# Patient Record
Sex: FEMALE | Race: WHITE
[De-identification: names, ages, dates, MRNs, and addresses within clinical notes are randomized per-mention and may not be internally consistent; named-entity substitution may affect disease eponyms.]

---

## 2017-09-15 ENCOUNTER — HOSPITAL ENCOUNTER (OUTPATIENT)
Dept: HOSPITAL 47 - RADMAMWWP | Age: 52
Discharge: HOME | End: 2017-09-15
Payer: COMMERCIAL

## 2017-09-15 DIAGNOSIS — Z12.31: Primary | ICD-10-CM

## 2017-09-18 NOTE — MM
Reason for exam: screening  (asymptomatic).

Last mammogram was performed 3 years and 5 months ago.



History:

Benign excisional biopsy of the right breast, 2002.

Took hormonal contraceptives for 9 years.



Physical Findings:

A clinical breast exam by your physician is recommended on an annual basis and 

results should be correlated with mammographic findings.



MG Screening Mammo w CAD

Bilateral CC and MLO view(s) were taken.

Prior study comparison: April 29, 2014, bilateral digital screening mammo w/CAD.  

December 30, 2008, bilateral digital screening mammogram.

There are scattered fibroglandular densities.  No significant changes when 

compared with prior studies.





ASSESSMENT: Benign, BI-RAD 2



RECOMMENDATION:

Routine screening mammogram of both breasts in 1 year.

## 2020-06-10 ENCOUNTER — HOSPITAL ENCOUNTER (OUTPATIENT)
Dept: HOSPITAL 47 - LABPAT | Age: 55
Discharge: HOME | End: 2020-06-10
Attending: ORTHOPAEDIC SURGERY
Payer: COMMERCIAL

## 2020-06-10 DIAGNOSIS — S83.512D: ICD-10-CM

## 2020-06-10 DIAGNOSIS — Z01.818: Primary | ICD-10-CM

## 2020-06-10 DIAGNOSIS — M23.92: ICD-10-CM

## 2020-06-10 LAB
ANION GAP SERPL CALC-SCNC: 8 MMOL/L
BASOPHILS # BLD AUTO: 0 K/UL (ref 0–0.2)
BASOPHILS NFR BLD AUTO: 0 %
CHLORIDE SERPL-SCNC: 106 MMOL/L (ref 98–107)
CO2 SERPL-SCNC: 26 MMOL/L (ref 22–30)
EOSINOPHIL # BLD AUTO: 0.2 K/UL (ref 0–0.7)
EOSINOPHIL NFR BLD AUTO: 2 %
ERYTHROCYTE [DISTWIDTH] IN BLOOD BY AUTOMATED COUNT: 4.71 M/UL (ref 3.8–5.4)
ERYTHROCYTE [DISTWIDTH] IN BLOOD: 13.3 % (ref 11.5–15.5)
HCT VFR BLD AUTO: 43.3 % (ref 34–46)
HGB BLD-MCNC: 14.4 GM/DL (ref 11.4–16)
LYMPHOCYTES # SPEC AUTO: 3.3 K/UL (ref 1–4.8)
LYMPHOCYTES NFR SPEC AUTO: 38 %
MCH RBC QN AUTO: 30.5 PG (ref 25–35)
MCHC RBC AUTO-ENTMCNC: 33.2 G/DL (ref 31–37)
MCV RBC AUTO: 91.9 FL (ref 80–100)
MONOCYTES # BLD AUTO: 0.4 K/UL (ref 0–1)
MONOCYTES NFR BLD AUTO: 4 %
NEUTROPHILS # BLD AUTO: 4.6 K/UL (ref 1.3–7.7)
NEUTROPHILS NFR BLD AUTO: 53 %
PLATELET # BLD AUTO: 302 K/UL (ref 150–450)
POTASSIUM SERPL-SCNC: 5.4 MMOL/L (ref 3.5–5.1)
SODIUM SERPL-SCNC: 140 MMOL/L (ref 137–145)
WBC # BLD AUTO: 8.7 K/UL (ref 3.8–10.6)

## 2020-06-10 PROCEDURE — 85025 COMPLETE CBC W/AUTO DIFF WBC: CPT

## 2020-06-10 PROCEDURE — 80051 ELECTROLYTE PANEL: CPT

## 2020-06-10 PROCEDURE — 36415 COLL VENOUS BLD VENIPUNCTURE: CPT

## 2020-06-10 PROCEDURE — 93005 ELECTROCARDIOGRAM TRACING: CPT

## 2020-06-23 ENCOUNTER — HOSPITAL ENCOUNTER (OUTPATIENT)
Dept: HOSPITAL 47 - RADMAMWWP | Age: 55
End: 2020-06-23
Attending: FAMILY MEDICINE
Payer: COMMERCIAL

## 2020-06-23 DIAGNOSIS — Z12.31: Primary | ICD-10-CM

## 2020-06-23 PROCEDURE — 77063 BREAST TOMOSYNTHESIS BI: CPT

## 2020-06-23 PROCEDURE — 77067 SCR MAMMO BI INCL CAD: CPT

## 2020-06-24 NOTE — HP
HISTORY AND PHYSICAL



Surgery 06/25/2020.



Yudi Cruz 54-year-old patient seen with progressive left knee pain, instability

consistent with meniscal tear and anterior cruciate ligament tear.  We discussed

options.  She elected to proceed with arthroscopy to include allograft ACL

reconstruction.  Consent regarding the procedure was obtained.



PAST MEDICAL HISTORY:

Hypertension.



PAST SURGICAL HISTORY:

Carpal tunnel release.  Colonoscopy.



MEDICATIONS:

Atenolol.



ALLERGIES:

None.



SOCIAL HISTORY:

She denies tobacco use.



PHYSICAL EXAMINATION:

Evaluation of the left knee range of motion 0-130.  Mild effusion.  Tenderness along

the medial and lateral joint lines.  Positive medial Tana's.  Plus one/two Lachman.

Pivot shift equivocal. Collateral ligaments are stable.  Distal neurovascular exam

intact.



Left knee radiographs revealed moderate medial compartment osteoarthritis.  An MRI of

the left knee revealed medial meniscal tear, anterior cruciate ligament tear and large

joint effusion.



IMPRESSION:

1. Internal derangement, left knee with medial meniscal tear and anterior cruciate

    ligament tear.

2. Left knee osteoarthritis.

3. Hypertension.



PLAN:

Left knee arthroscopy with partial meniscectomy, allograft ACL reconstruction and

debridement.





MMODL / IJN: [id number] / Job#: [id number]

## 2020-06-25 ENCOUNTER — HOSPITAL ENCOUNTER (OUTPATIENT)
Dept: HOSPITAL 47 - OR | Age: 55
Discharge: HOME | End: 2020-06-25
Attending: ORTHOPAEDIC SURGERY
Payer: COMMERCIAL

## 2020-06-25 VITALS — DIASTOLIC BLOOD PRESSURE: 78 MMHG | HEART RATE: 71 BPM | SYSTOLIC BLOOD PRESSURE: 128 MMHG

## 2020-06-25 VITALS — RESPIRATION RATE: 16 BRPM | TEMPERATURE: 96.8 F

## 2020-06-25 VITALS — BODY MASS INDEX: 38.2 KG/M2

## 2020-06-25 DIAGNOSIS — M23.222: Primary | ICD-10-CM

## 2020-06-25 DIAGNOSIS — Z98.890: ICD-10-CM

## 2020-06-25 DIAGNOSIS — Z86.69: ICD-10-CM
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
## 2020-06-25 DIAGNOSIS — Z79.899: ICD-10-CM

## 2020-06-25 DIAGNOSIS — M94.262: ICD-10-CM

## 2020-06-25 DIAGNOSIS — I10: ICD-10-CM

## 2020-06-25 DIAGNOSIS — S83.512A: ICD-10-CM

## 2020-06-25 DIAGNOSIS — M17.12: ICD-10-CM

## 2020-06-25 DIAGNOSIS — M65.862: ICD-10-CM

## 2020-06-25 PROCEDURE — 29888 ARTHRS AID ACL RPR/AGMNTJ: CPT

## 2020-06-25 PROCEDURE — 84132 ASSAY OF SERUM POTASSIUM: CPT

## 2020-06-25 PROCEDURE — 29881 ARTHRS KNE SRG MNISECTMY M/L: CPT

## 2020-06-25 NOTE — P.OP
Date of Procedure: 06/25/20


Preoperative Diagnosis: 


Internal derangement left knee


Postoperative Diagnosis: 


1.  ACL tear left knee


2.  Medial meniscal tear left knee


3.  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


4.  Reactive synovitis medial, lateral and suprapatellar compartments left knee





Procedure(s) Performed: 


1.  Arthroscopic allograft ACL reconstruction left knee


2.  Arthroscopic partial medial meniscectomy left knee


3.  Arthroscopic chondroplasty medial femoral condyle left knee


4.  Arthroscopic partial synovectomy medial, lateral and suprapatellar 

compartments left knee


Implants: 


2Arthrex Endobutton


Anesthesia: GETA, local


Surgeon: Alvaro Arango


Assistant #1: Zachary Perdomo


Estimated Blood Loss (ml): 20


Pathology: none sent


Condition: stable


Disposition: PACU


Indications for Procedure: 


54-year-old patient seen with left knee pain and instability consistent with 

anterior cruciate ligament tear meniscal tear.  We discussed proceeding with 

arthroscopy to include allograft ACL reconstruction, patient was agreeable and 

consent was obtained.


Operative Findings: 


See description of procedure


Description of Procedure: 


Patient was taken to the operative suite.  Patient underwent a general 

anesthetic by the department of anesthesia.  Patient was given preoperative 

antibiotics.  The left lower extremity was placed in a well-padded arthroscopic 

leg gutierrez.  The left leg was prepped and draped in the normal sterile 

orthopedic fashion.  A lateral parapatellar and suprapatellar incision was made.

 Trochars were inserted.  Arthroscopy was initiated.  Suprapatellar pouch 

revealed diffuse thick reactive synovitis.  The patellofemoral joint appeared 

articulate congruently.  There was grade 2 chondromalacia of the patella with no

osteochondral tears present.  The scope was guided into the medial gutter.  No 

loose bodies or plica were identified.  The scope was then guided into the 

medial compartment.  A medial parapatellar incision was made.  Trocar inserted 

followed by probe.  There was a complex tear posterior horn medial meniscus.  

There were grade 3 chondral malacia changes of medial femoral condyle with some 

osteochondral flap tears present.  There was thick reactive synovitis 

anteriorly. Scope and probe were then guided into the intercondylar notch noting

a complete tear of the anterior cruciate ligament and an intact posterior 

cruciate ligament.  At this point Porfirio PALOMO opened the allograft and began 

preparing for implantation.  I guided the scope back into the medial 

compartment.  I performed a partial medial meniscectomy.  I performed a partial 

synovectomy.  I performed a chondroplasty medial femoral condyle.  The residual 

meniscus was stable.  The residual osteochondral surface was stable.  There was 

good decompression of the synovitis.  The scope and probe were then guided into 

lateral compartment.  The lateral meniscus was stable.  There was minimal mild 

grade 1 chondromalacia of the lateral compartment.  There was thick reactive 

synovitis anteriorly.  I performed a partial synovectomy.  There was good 

decompression of synovitis.  I now guided the scope back into the intercondylar 

notch.  I debrided the remnants of the anterior cruciate ligament.  I performed 

a notchplasty.  With the assistance of Porfirio PALOMO I created femoral and 

tibial tunnels.  We now brought the graft into the operative field.  We now 

passed the femoral tunnel making sure the Endobutton was engaged and we 

appropriately shoulder into the tunnel.  We now shuttled the tibial side of our 

graft into our tibial tunnel.  We then placed appropriate tension to the graft 

and then secured the tibial side with an Endobutton.  Residual suture limbs were

clipped.  We tension the femoral side.  A good positioning of the graft with 

excellent fixation and good intraoperative stability.  The scope was in guided 

back into the suprapatellar compartment.  I introduced a motorized shaver into 

the super compartment.  I debrided some debris/meniscal tissue and performed a 

partial synovectomy decompressing reactive synovitis.  There was good 

decompression of synovitis.  I took one more look on the entire knee no was no 

residual debris.  Instruments were now removed from the joint.  The joint was 

infiltrated with .25% Marcaine.  All portal sites are many medial incision were 

repaired with nylon suture.  Sterile dressings were applied.  The patient was 

placed into a JOSE DANIEL hose.  The extremity was placed into a knee immobilizer.  No 

tourniquet was utilized.  The patient was awakened, transferred to a bed and 

taken to recovery stable satisfactory condition.  Porfirio PALOMO assisted with 

the procedure.

## 2020-06-25 NOTE — MM
Reason for exam: screening  (asymptomatic).

Last mammogram was performed 2 years and 9 months ago.



History:

Benign excisional biopsy of the right breast, 2002.

Took hormonal contraceptives for 9 years.



Physical Findings:

A clinical breast exam by your physician is recommended on an annual basis and 

results should be correlated with mammographic findings.



MG 3D Screening Mammo W/Cad

Bilateral CC and MLO view(s) were taken.

Prior study comparison: September 15, 2017, bilateral MG screening mammo w CAD.  

April 29, 2014, bilateral digital screening mammo w/CAD.

The breast tissue is heterogeneously dense. This may lower the sensitivity of 

mammography.  There is chronic nodularity in the left breast anterior upper outer 

quadrant.  No significant changes when compared with prior studies.





ASSESSMENT: Benign, BI-RAD 2



RECOMMENDATION:

Routine screening mammogram of both breasts in 1 year.

## 2020-06-25 NOTE — P.ANPRN
Procedure Note - Anesthesia





- Nerve Block Performed


  ** Left Adductor Canal Single


Time Out Performed: Yes


Date of Procedure: 06/25/20


Procedure Start Time: 08:37


Procedure Stop Time: 08:45


Location of Patient: PreOp


Indication: Acute Post-Operative Pain, Requested by Surgeon


Sedation Type: Sedate with meaningful contact maintained


Preparation: Sterile Prep, Sterile Dressing


Position: Supine


Catheter: None


Needle Types: Pajunk


Needle Gauge: 20


Ultrasound used to visualize needle placement: Yes


Ultrasound used to observe medication spread: Yes


Injectate: 0.5% Ropivacaine (see comment for volume) (30 ml + decadron 4 mg)


Blood Aspirated: No


Pain Paresthesia on Injection Noted: No


Resistance on Injection: Normal


Image Stored and Saved: Yes


Events: Uneventful and Well Tolerated

## 2021-06-28 ENCOUNTER — HOSPITAL ENCOUNTER (OUTPATIENT)
Dept: HOSPITAL 47 - RADMAMWWP | Age: 56
Discharge: HOME | End: 2021-06-28
Attending: FAMILY MEDICINE
Payer: COMMERCIAL

## 2021-06-28 DIAGNOSIS — Z12.31: Primary | ICD-10-CM

## 2021-06-28 PROCEDURE — 77067 SCR MAMMO BI INCL CAD: CPT

## 2021-06-28 PROCEDURE — 77063 BREAST TOMOSYNTHESIS BI: CPT

## 2021-06-30 NOTE — MM
Reason for exam: screening  (asymptomatic).

Last mammogram was performed 1 year ago.



History:

Benign excisional biopsy of the right breast, 2002.

Took hormonal contraceptives for 9 years.



Physical Findings:

A clinical breast exam by your physician is recommended on an annual basis and 

results should be correlated with mammographic findings.



MG 3D Screening Mammo W/Cad

Bilateral CC and MLO view(s) were taken.

Prior study comparison: June 23, 2020, bilateral MG 3d screening mammo w/cad.  

September 15, 2017, bilateral MG screening mammo w CAD.

The breast tissue is heterogeneously dense. This may lower the sensitivity of 

mammography.





ASSESSMENT: Benign, BI-RAD 2



RECOMMENDATION:

Routine screening mammogram of both breasts in 1 year.

## 2022-06-10 ENCOUNTER — HOSPITAL ENCOUNTER (OUTPATIENT)
Dept: HOSPITAL 47 - RADMAMWWP | Age: 57
Discharge: HOME | End: 2022-06-10
Attending: FAMILY MEDICINE
Payer: COMMERCIAL

## 2022-06-10 DIAGNOSIS — Z12.31: Primary | ICD-10-CM

## 2022-06-10 PROCEDURE — 77063 BREAST TOMOSYNTHESIS BI: CPT

## 2022-06-10 PROCEDURE — 77067 SCR MAMMO BI INCL CAD: CPT

## 2022-06-13 NOTE — MM
Reason for Exam: Screening  (asymptomatic). 

Last screening mammogram was performed 12 month(s) ago.





Patient History: 

Menarche at age 13. First Full-Term Pregnancy at age 25. Hormonal Contraceptives for 9 years until

age 27. 2002, Benign Excisional Biopsy on the right side. 





Risk Values: 

Piedad 5 year model risk: 1.3%.

NCI Lifetime model risk: 9.6%.





Prior Study Comparison: 

9/15/2017 Bilateral Screening Mammogram, [facility]. 6/23/2020 Bilateral Screening Mammogram, [facility].

6/28/2021 Bilateral Screening Mammogram, [facility]. 





Tissue Density: 

The breast tissue is heterogeneously dense. This may lower the sensitivity of mammography.





Findings: 

Analyzed By CAD. 

There is no suspicious group of microcalcifications or new suspicious mass in either breast. 





Overall Assessment: Negative, BI-RAD 1





Management: 

Screening Mammogram of both breasts in 1 year.

A clinical breast exam by your physician is recommended on an annual basis and results should be

correlated with mammographic findings.



Electronically signed and approved by: Leopold M. Fregoli, M.D. Radiologis

## 2023-03-24 ENCOUNTER — HOSPITAL ENCOUNTER (OUTPATIENT)
Dept: HOSPITAL 47 - RADXRMAIN | Age: 58
Discharge: HOME | End: 2023-03-24
Attending: FAMILY MEDICINE
Payer: COMMERCIAL

## 2023-03-24 DIAGNOSIS — M77.32: ICD-10-CM

## 2023-03-24 DIAGNOSIS — M79.672: Primary | ICD-10-CM

## 2023-03-25 NOTE — XR
EXAMINATION TYPE: XR foot limited LT

 

DATE OF EXAM: 3/24/2023

 

COMPARISON: None

 

HISTORY: Pain

 

TECHNIQUE: 2 view left foot

 

FINDINGS: No acute fracture or dislocation is evident. Plantar calcaneal heel spur is present. Some m
ild soft tissue swelling over the distal dorsal foot is present.

 

The metatarsals appear intact. Alignment is preserved. Follow up exams can be performed 7-10 days fro
m acute trauma for continued pain.

 

IMPRESSION:

1.  No acute osseous abnormality left foot.

2. Plantar calcaneal heel spur

## 2023-07-14 ENCOUNTER — HOSPITAL ENCOUNTER (OUTPATIENT)
Dept: HOSPITAL 47 - RADBDWWP | Age: 58
Discharge: HOME | End: 2023-07-14
Attending: FAMILY MEDICINE
Payer: COMMERCIAL

## 2023-07-14 DIAGNOSIS — Z78.0: ICD-10-CM

## 2023-07-14 DIAGNOSIS — M85.89: ICD-10-CM

## 2023-07-14 DIAGNOSIS — Z12.31: Primary | ICD-10-CM

## 2023-07-14 PROCEDURE — 77080 DXA BONE DENSITY AXIAL: CPT

## 2023-07-14 PROCEDURE — 77063 BREAST TOMOSYNTHESIS BI: CPT

## 2023-07-14 PROCEDURE — 77067 SCR MAMMO BI INCL CAD: CPT

## 2023-07-14 NOTE — BD
EXAMINATION TYPE: Axial Bone Density

 

DATE OF EXAM: 7/14/2023

 

CLINICAL HISTORY: 57 years old Female.  ICD-10 CODE: Z78.0 POST MENOPAUSAL WITHOUT HRT

 

Nuclear Medicine Study in the last 2 weeks:

Barium Study in the last week:

Pregnant:

Height:

Weight:

 

FRAX RISK QUESTIONS:

Alcohol (3 or more units per day):  no

Family History (Parent hip fracture):  no

Glucocorticoids (More than 3mos):  no

History of Fracture in Adulthood: no

 

Secondary Osteoporosis:

  1.  Type 1 Diabetes: no

  2.  Hyperthyroidism: no

  3.  Menopause before 45: no

  4.  Malnutrition: no

  5.  Chronic liver disease: no

Rheumatoid Arthritis: no

Current Tobacco Use: no

 

RISK FACTORS 

HISTORY OF: 

Hip Fracture (Right/Left): no

Spine Fracture: no

History of Wrist Fracture: yes 

When: age 9

Surgery to Spine/Hip(right/left)/Wrist (right/left): no

Family History of Osteoporosis: no

Active: somewhat

Diet low in dairy products/other sources of calcium:  yes

Postmenopausal woman: yes

Take estrogen and/or progesterone medications: no

Lost more than 2 inches in height since high school: no

Frequent falls: no

Poor Health: no

Hyperparathyroidism: no

Adrenal Insufficiency: no

 

MEDICATIONS: 

Prednisone or other steroids: no

Thyroid Medications:  no

Osteoporosis Medications: no

Additional Medications: BPMed, 

 

 

Additional History:

 

 

EXAM MEASUREMENTS: 

Bone mineral densitometry was performed using the [employer] System.

Bone mineral density as measured about the Lumbar spine is:  

----- L1-L4(G/cm2): 1.200

T Score Values are as follows:

----- L1: -0.1

----- L2: 0.0

----- L3: 0.1

----- L4: 0.2

----- L1-L4: 0.2

 

Z Score Values are as follows:

----- L1: -0.4

----- L2: -0.3

----- L3: -0.2

----- L4: -0.1

----- L1-L4: -0.1

 

Baseline Study 

 

Bone mineral density about the R hip (g/cm2): 1.074

Bone mineral density about the L hip (g/cm2): 1.125

T Score values are as follows:

-----R Neck: -0.9

-----L Neck: -1.3

-----R Total: 0.5

-----L Total: 0.9

 

Z Score values are as follows:

-----R Neck: -0.6

-----L Neck: -1.0

-----R Total: 0.4

-----L Total: 0.8

Baseline Study 

 

FRAX%s: The graph provided illustrates a 5.6 % chance for a major osteoporotic fx and a 0.3% chance f
or the hips probability for fx in 10 years time.

 

 

 

 

IMPRESSION:

Osteopenia (T Score between -2.5 and -1).

 

There is slightly increased risk of fracture and the patient may be considered 

for treatment. 

 

Re-Screen 2-5 years.

 

NOTE:  T-SCORE=SD OF THE YOUNG ADULT MEAN.

## 2023-07-17 NOTE — MM
Reason for Exam: Screening  (asymptomatic). 

Last mammogram was performed 1 year(s) and 1 month(s) ago. 





Patient History: 

Menarche at age 13. First Full-Term Pregnancy at age 25. Postmenopausal. Hormonal Contraceptives for

9 years until age 27. 2002, Benign Excisional Biopsy on the right side. 





Risk Values: 

Piedad 5 year model risk: 1.4%.

NCI Lifetime model risk: 9.5%.





Prior Study Comparison: 

6/23/2020 Bilateral Screening Mammogram, [facility]. 6/28/2021 Bilateral Screening Mammogram, [facility].

6/10/2022 Bilateral MG 3D screening mammo w/cad, [facility]. 





Tissue Density: 

The breast tissue is heterogeneously dense. This may lower the sensitivity of mammography.





Findings: 

Analyzed By CAD. 

Nodular density upper outer left breast approximately 5 cm from the nipple. Additional views are

recommended. No suspicious calcifications are seen within either breast. 





Overall Assessment: Incomplete: need additional imaging evaluation, BI-RAD 0





Management: 

Diagnostic Mammogram of the left breast.

.



Patient should continue monthly self-breast exams.  A clinical breast exam by your physician is

recommended on an annual basis.

This exam should not preclude additional follow-up of suspicious palpable abnormalities.



Note on Piedad scores and lifetime risk:

1. A Piedad score greater than 3% is considered moderate risk. If this is the case, consider

specialist referral to assess eligibility for a risk reducing agent.

2. If overall lifetime risk for the development of breast cancer is 20% or higher, the patient may

qualify for future screening with alternating mammogram and breast MRI.



Electronically signed and approved by: Leopold M. Fregoli, M.D. Radiologis

## 2023-07-26 ENCOUNTER — HOSPITAL ENCOUNTER (OUTPATIENT)
Dept: HOSPITAL 47 - RADMAMWWP | Age: 58
Discharge: HOME | End: 2023-07-26
Attending: FAMILY MEDICINE
Payer: COMMERCIAL

## 2023-07-26 DIAGNOSIS — R92.8: Primary | ICD-10-CM

## 2023-07-26 DIAGNOSIS — Z78.0: ICD-10-CM

## 2023-07-27 NOTE — USB
Reason for Exam: Additional evaluation requested from abnormal screening. 





Patient History: 

Menarche at age 13. First Full-Term Pregnancy at age 25. Postmenopausal. Hormonal Contraceptives for

9 years until age 27. 2002, Benign Excisional Biopsy on the right side. 





Risk Values: 

Piedad 5 year model risk: 1.4%.

NCI Lifetime model risk: 9.3%.





Prior Study Comparison: 

6/28/2021 Bilateral Screening Mammogram, [facility]. 6/10/2022 Bilateral MG 3D screening mammo w/cad, [facility].

7/14/2023 Bilateral MG 3D screening mammo w/cad, [facility]. 





Findings: 

Imaged: Ultrasound imaging of: Area of concern, retroareolar region and axilla.

Anechoic cyst at 2:00 5 cm from the nipple correlating with area seen on mammogram. This is seen

dating back to at least 2017 mammogram.

No evidence for mass. 





Overall Assessment: Benign, BI-RAD 2





Management: 

Screening Mammogram of both breasts in 1 year.

A clinical breast exam by your physician is recommended on an annual basis and results should be

correlated with mammographic findings. This exam should not preclude additional follow-up of

suspicious palpable abnormalities. Results were given to the patient verbally at the time of exam.



Electronically signed and approved by: Tl Hastings DO

## 2025-03-27 ENCOUNTER — HOSPITAL ENCOUNTER (OUTPATIENT)
Dept: HOSPITAL 47 - RADMAMWWP | Age: 60
Discharge: HOME | End: 2025-03-27
Attending: FAMILY MEDICINE
Payer: COMMERCIAL

## 2025-03-27 DIAGNOSIS — Z92.0: ICD-10-CM

## 2025-03-27 DIAGNOSIS — Z12.31: Primary | ICD-10-CM

## 2025-03-27 DIAGNOSIS — R92.333: ICD-10-CM

## 2025-03-27 DIAGNOSIS — Z78.0: ICD-10-CM

## 2025-03-27 PROCEDURE — 77063 BREAST TOMOSYNTHESIS BI: CPT

## 2025-03-27 PROCEDURE — 77067 SCR MAMMO BI INCL CAD: CPT

## 2025-03-28 NOTE — MM
Reason for Exam: Screening  (asymptomatic). 

Last mammogram was performed 1 year(s) and 8 month(s) ago. 





Patient History: 

Menarche at age 13. First Full-Term Pregnancy at age 25. Postmenopausal. Hormonal Contraceptives for

9 years until age 27. 2002, Benign Excisional Biopsy on the right side. 





Risk Values: 

Piedad 5 year model risk: 1.5%.

NCI Lifetime model risk: 9.1%.





Prior Study Comparison: 

6/28/2021 Bilateral Screening Mammogram, [facility]. 6/10/2022 Bilateral MG 3D screening mammo w/cad, [facility].

7/14/2023 Bilateral MG 3D screening mammo w/cad, [facility]. 





Tissue Density: 

The breasts are heterogeneously dense, which may obscure small masses.





Findings: 

Analyzed By CAD. 

There is no suspicious group of microcalcifications or new suspicious mass in either breast. Stable

areas of chronic nodularity. 





Overall Assessment: Benign, BI-RAD 2





Management: 

Screening Mammogram of both breasts in 1 year.

.



Patient should continue monthly self-breast exams.  A clinical breast exam by your physician is

recommended on an annual basis.

This exam should not preclude additional follow-up of suspicious palpable abnormalities.



Note on Piedad scores and lifetime risk:

1. A Piedad score greater than 3% is considered moderate risk. If this is the case, consider

specialist referral to assess eligibility for a risk reducing agent.

2. If overall lifetime risk for the development of breast cancer is 20% or higher, the patient may

qualify for future screening with alternating mammogram and breast MRI.









[facility], Workstation: [id number], 3/28/2025 7:42 AM.



Electronically signed and approved by: Lance Rossi M.D. Radiologis